# Patient Record
Sex: FEMALE | Race: WHITE | NOT HISPANIC OR LATINO | Employment: FULL TIME | URBAN - METROPOLITAN AREA
[De-identification: names, ages, dates, MRNs, and addresses within clinical notes are randomized per-mention and may not be internally consistent; named-entity substitution may affect disease eponyms.]

---

## 2021-04-09 ENCOUNTER — HOSPITAL ENCOUNTER (EMERGENCY)
Facility: MEDICAL CENTER | Age: 61
End: 2021-04-09
Attending: EMERGENCY MEDICINE | Admitting: EMERGENCY MEDICINE
Payer: OTHER MISCELLANEOUS

## 2021-04-09 VITALS
RESPIRATION RATE: 20 BRPM | TEMPERATURE: 98.2 F | HEART RATE: 67 BPM | BODY MASS INDEX: 20.07 KG/M2 | DIASTOLIC BLOOD PRESSURE: 87 MMHG | HEIGHT: 67 IN | SYSTOLIC BLOOD PRESSURE: 119 MMHG | WEIGHT: 127.87 LBS | OXYGEN SATURATION: 98 %

## 2021-04-09 DIAGNOSIS — M76.32 IT BAND SYNDROME, LEFT: ICD-10-CM

## 2021-04-09 PROCEDURE — 99281 EMR DPT VST MAYX REQ PHY/QHP: CPT

## 2021-04-09 RX ORDER — METHYLPREDNISOLONE 4 MG/1
TABLET ORAL
Qty: 21 TABLET | Refills: 0 | Status: SHIPPED | OUTPATIENT
Start: 2021-04-09

## 2021-04-09 ASSESSMENT — PAIN DESCRIPTION - PAIN TYPE: TYPE: ACUTE PAIN

## 2021-04-09 NOTE — ED PROVIDER NOTES
ED Provider Note    CHIEF COMPLAINT   Chief Complaint   Patient presents with   • Knee Pain       HPI   Minda Gutierrez is a 60 y.o. female who presents to the emergency department with a chief complaint of left knee pain.  The patient has a remote history of surgery on the left knee.  She had her ACL repaired when she lived in [place].  She is moving to [place].  They have been moving a lot of boxes and having increased physical activity with moving.  She has been bending and stooping.  Carrying heavy loads.  She points to an area on the outside of her left knee as the location of pain.  Specifically over the proximal fibula as the location of pain.  She says that she has some pain that shoots down the lateral and anterior aspect of the lower leg.  She not noticed any swelling.  No erythema.  There was no discrete injury.    REVIEW OF SYSTEMS   See HPI for further details. All other systems are negative.     PAST MEDICAL HISTORY   Past Medical History:   Diagnosis Date   • Anesthesia     ponv       FAMILY HISTORY  Family History   Problem Relation Age of Onset   • Hypertension Mother    • Heart Disease Father        SOCIAL HISTORY  Social History     Socioeconomic History   • Marital status: Other     Spouse name: Not on file   • Number of children: Not on file   • Years of education: Not on file   • Highest education level: Not on file   Occupational History   • Not on file   Tobacco Use   • Smoking status: Never Smoker   • Smokeless tobacco: Never Used   Substance and Sexual Activity   • Alcohol use: No   • Drug use: No   • Sexual activity: Not on file   Other Topics Concern   • Not on file   Social History Narrative   • Not on file     Social Determinants of Health     Financial Resource Strain:    • Difficulty of Paying Living Expenses:    Food Insecurity:    • Worried About Running Out of Food in the Last Year:    • Ran Out of Food in the Last Year:    Transportation Needs:    • Lack of Transportation  "(Medical):    • Lack of Transportation (Non-Medical):    Physical Activity:    • Days of Exercise per Week:    • Minutes of Exercise per Session:    Stress:    • Feeling of Stress :    Social Connections:    • Frequency of Communication with Friends and Family:    • Frequency of Social Gatherings with Friends and Family:    • Attends Hinduism Services:    • Active Member of Clubs or Organizations:    • Attends Club or Organization Meetings:    • Marital Status:    Intimate Partner Violence:    • Fear of Current or Ex-Partner:    • Emotionally Abused:    • Physically Abused:    • Sexually Abused:        SURGICAL HISTORY  Past Surgical History:   Procedure Laterality Date   • RHYTIDECTOMY  6/4/2012    Performed by JEWEL FLETCHER at [facility]   • BLEPHAROPLASTY  6/4/2012    Performed by JEWEL FLETCHER at [facility]   • RHINOPLASTY  6/4/2012    Performed by JEWEL FLETCHER at [facility]   • APPENDECTOMY     • SREEDHAR BY LAPAROSCOPY     • HYSTERECTOMY, TOTAL ABDOMINAL     • MAMMOPLASTY AUGMENTATION     • OTHER      bowel obstruction   • OTHER      b/l TMJ surgery       CURRENT MEDICATIONS   Home Medications    **Home medications have not yet been reviewed for this encounter**         ALLERGIES   Allergies   Allergen Reactions   • Codeine Nausea       PHYSICAL EXAM  VITAL SIGNS: /87   Pulse 67   Temp 36.8 °C (98.2 °F) (Temporal)   Resp 20   Ht 1.702 m (5' 7\")   Wt 58 kg (127 lb 13.9 oz)   SpO2 98%   BMI 20.03 kg/m²   Constitutional: Well developed, Well nourished, No acute distress, Non-toxic appearance.   Cardiovascular: Extremities are warm and well-perfused, capillary refill is less than 2 seconds.  Thorax & Lungs: Normal work of breathing.    Abdomen: Distended  Skin: Warm, Dry, No erythema, No rash.   Extremities: Intact distal pulses, No cyanosis, No clubbing.  No palpable cords.  Calf tenderness.  Musculoskeletal: There is tenderness to " palpation over the left fibular head.  There is no knee effusion.  There is no lateral joint line tenderness to palpation.  She does not have pain with lateral stress.  Neurologic: Alert & oriented x 3, Normal motor function, Normal sensory function, No focal deficits noted.     RADIOLOGY/PROCEDURES      COURSE & MEDICAL DECISION MAKING  Pertinent Labs & Imaging studies reviewed. (See chart for details)    Physical examination is suggestive of tenderness at the insertion of the iliotibial band or lateral hamstring.  I feel this is more of a tendinitis/overuse type of injury.  Differential diagnosis includes injury to the lateral collateral ligament as well as lateral meniscus although I feel these 2 are less likely.  I will treat the patient with a Medrol dose pack.  She is been instructed to ice the area after activity.  I have asked her to reduce her physical activity as possible.  She was offered crutches but she declined.    Patient is to try the Medrol Dosepak.  See if her symptoms improve.  If not she has been referred to the [facility] for further evaluation for unresolved symptoms.  Discharged in stable condition.    The patient will return for new or worsening symptoms and is stable at the time of discharge.    The patient is referred to a primary physician for blood pressure management, diabetic screening, and for all other preventative health concerns.    DISPOSITION:  Patient will be discharged home in stable condition.    FOLLOW UP:  [facility], Emergency Dept  [address]  [facility] [address] [phone]    If symptoms worsen    Eligio Mathis M.D.  [address] [facility] [address]  [phone]    Schedule an appointment as soon as possible for a visit         OUTPATIENT MEDICATIONS:  New Prescriptions    METHYLPREDNISOLONE (MEDROL) 4 MG TAB    Take as per the package instructions.         FINAL IMPRESSION  1. It band syndrome, left             Electronically signed by: Vlad Winkler M.D., 4/9/2021 11:21 AM

## 2021-04-09 NOTE — ED NOTES
Discharge instructions reviewed with patient. AVS signed by patient. Patient understand option to schedule follow-up appointment with orthopedic team and to return to ED for worsening symptoms. All questions answered at this time. Patient ambulated to exit with belongings and visitor. Patient in stable condition with no signs of distress.

## 2021-04-09 NOTE — ED TRIAGE NOTES
"Pt reports a hx of left knee surgery approximately 2 years ago.  Since the intervention she has been experiencing recurrence of pain.  She reports exacerbation of her condition since yesterday, while \"moving heavy boxes.\".  Denies acute trauma.   Chief Complaint   Patient presents with   • Knee Pain     /87   Pulse 67   Temp 36.8 °C (98.2 °F) (Temporal)   Resp 20   Ht 1.702 m (5' 7\")   Wt 58 kg (127 lb 13.9 oz)   SpO2 98%   BMI 20.03 kg/m²       "